# Patient Record
Sex: MALE | Race: WHITE
[De-identification: names, ages, dates, MRNs, and addresses within clinical notes are randomized per-mention and may not be internally consistent; named-entity substitution may affect disease eponyms.]

---

## 2018-07-31 PROBLEM — Z00.00 ENCOUNTER FOR PREVENTIVE HEALTH EXAMINATION: Status: ACTIVE | Noted: 2018-07-31

## 2018-08-14 ENCOUNTER — RECORD ABSTRACTING (OUTPATIENT)
Age: 63
End: 2018-08-14

## 2018-08-14 DIAGNOSIS — Z82.49 FAMILY HISTORY OF ISCHEMIC HEART DISEASE AND OTHER DISEASES OF THE CIRCULATORY SYSTEM: ICD-10-CM

## 2018-08-14 DIAGNOSIS — Z80.3 FAMILY HISTORY OF MALIGNANT NEOPLASM OF BREAST: ICD-10-CM

## 2018-08-14 DIAGNOSIS — F17.200 NICOTINE DEPENDENCE, UNSPECIFIED, UNCOMPLICATED: ICD-10-CM

## 2018-08-14 LAB — HBA1C MFR BLD: 5

## 2018-08-30 ENCOUNTER — APPOINTMENT (OUTPATIENT)
Dept: ENDOCRINOLOGY | Facility: CLINIC | Age: 63
End: 2018-08-30
Payer: COMMERCIAL

## 2018-08-30 VITALS
DIASTOLIC BLOOD PRESSURE: 88 MMHG | BODY MASS INDEX: 29.9 KG/M2 | HEART RATE: 98 BPM | WEIGHT: 233 LBS | SYSTOLIC BLOOD PRESSURE: 140 MMHG | HEIGHT: 74 IN

## 2018-08-30 DIAGNOSIS — E78.4 OTHER HYPERLIPIDEMIA: ICD-10-CM

## 2018-08-30 DIAGNOSIS — R73.09 OTHER ABNORMAL GLUCOSE: ICD-10-CM

## 2018-08-30 DIAGNOSIS — E21.2 OTHER HYPERPARATHYROIDISM: ICD-10-CM

## 2018-08-30 PROCEDURE — 99214 OFFICE O/P EST MOD 30 MIN: CPT | Mod: 25

## 2018-08-30 PROCEDURE — 76536 US EXAM OF HEAD AND NECK: CPT

## 2018-08-30 RX ORDER — CHOLECALCIFEROL (VITAMIN D3) 50 MCG
50 MCG TABLET ORAL DAILY
Refills: 0 | Status: ACTIVE | COMMUNITY

## 2018-11-28 ENCOUNTER — APPOINTMENT (OUTPATIENT)
Dept: ENDOCRINOLOGY | Facility: CLINIC | Age: 63
End: 2018-11-28

## 2018-11-28 ENCOUNTER — APPOINTMENT (OUTPATIENT)
Dept: ENDOCRINOLOGY | Facility: CLINIC | Age: 63
End: 2018-11-28
Payer: COMMERCIAL

## 2018-11-28 VITALS
HEART RATE: 85 BPM | WEIGHT: 233 LBS | DIASTOLIC BLOOD PRESSURE: 80 MMHG | BODY MASS INDEX: 29.9 KG/M2 | SYSTOLIC BLOOD PRESSURE: 130 MMHG | HEIGHT: 74 IN

## 2018-11-28 DIAGNOSIS — F17.210 NICOTINE DEPENDENCE, CIGARETTES, UNCOMPLICATED: ICD-10-CM

## 2018-11-28 PROCEDURE — 99406 BEHAV CHNG SMOKING 3-10 MIN: CPT

## 2018-11-28 PROCEDURE — 99214 OFFICE O/P EST MOD 30 MIN: CPT | Mod: 25

## 2018-12-26 RX ORDER — TESTOSTERONE 12.5 MG/1
12.5 MG/ACT GEL, METERED TOPICAL
Qty: 6 | Refills: 0 | Status: DISCONTINUED | COMMUNITY
End: 2018-12-26

## 2019-03-18 ENCOUNTER — APPOINTMENT (OUTPATIENT)
Dept: ENDOCRINOLOGY | Facility: CLINIC | Age: 64
End: 2019-03-18
Payer: COMMERCIAL

## 2019-03-18 VITALS
WEIGHT: 235 LBS | HEART RATE: 95 BPM | DIASTOLIC BLOOD PRESSURE: 70 MMHG | SYSTOLIC BLOOD PRESSURE: 130 MMHG | BODY MASS INDEX: 30.16 KG/M2 | HEIGHT: 74 IN

## 2019-03-18 PROCEDURE — 99213 OFFICE O/P EST LOW 20 MIN: CPT

## 2019-03-19 NOTE — PHYSICAL EXAM
[Alert] : alert [No Acute Distress] : no acute distress [Well Nourished] : well nourished [Well Developed] : well developed [EOMI] : extra ocular movement intact [Normal Sclera/Conjunctiva] : normal sclera/conjunctiva [Normal Hearing] : hearing was normal [Supple] : the neck was supple [No LAD] : no lymphadenopathy [Thyroid Not Enlarged] : the thyroid was not enlarged [No Thyroid Nodules] : there were no palpable thyroid nodules [Normal Rate and Effort] : normal respiratory rhythm and effort [Clear to Auscultation] : lungs were clear to auscultation bilaterally [No Accessory Muscle Use] : no accessory muscle use [Normal Rate] : heart rate was normal  [Normal S1, S2] : normal S1 and S2 [Regular Rhythm] : with a regular rhythm [No Motor Deficits] : the motor exam was normal [Normal Gait] : normal gait [Acanthosis Nigricans] : no acanthosis nigricans [No Tremors] : no tremors [Normal Insight/Judgement] : insight and judgment were intact [Oriented x3] : oriented to person, place, and time [Normal Mood] : the mood was normal

## 2019-03-19 NOTE — REVIEW OF SYSTEMS
[Recent Weight Loss (___ Lbs)] : recent [unfilled] ~Ulb weight loss [Fatigue] : no fatigue [Visual Field Defect] : no visual field defect [Decreased Appetite] : appetite not decreased [Blurry Vision] : no blurred vision [Dysphagia] : no dysphagia [Dysphonia] : no dysphonia [Neck Pain] : no neck pain [Chest Pain] : no chest pain [Headache] : no headaches [Tremors] : no tremors [Palpitations] : no palpitations [Depression] : no depression [Anxiety] : no anxiety [Cold Intolerance] : cold tolerant [Heat Intolerance] : heat tolerant [Easy Bruising] : no tendency for easy bruising [Swelling] : no swelling

## 2019-03-19 NOTE — ASSESSMENT
[FreeTextEntry1] : 64 y/o male with Testicular hypofunction, Hyperlipidemia, Hyperparathyroidism and Vitamin D Deficiency. No recent labs. \par \par Plan: \par Testicular Hypofunction: labs now\par - DEXA in 2018 revealed Osteopenia - next DEXA in 2020\par - Pt. refuses MRI due to feelings of claustrophobic with open MRI\par - continue current dose of Testosterone (1%) Transdermal Gel 3 pumps a daily\par \par Hyperlipidemia: labs now\par \par Hyperparathyroidism: check labs now, ordered 24 hour urine calcium \par \par Vitamin D Deficiency: labs now, continue current vitamin D deficiency supplement \par \par Follow up in 3 months.

## 2019-03-19 NOTE — REASON FOR VISIT
[Follow-Up: _____] : a [unfilled] follow-up visit [FreeTextEntry1] : Testicular hypofunction, Hyperlipidemia, Hyperparathyroidism and Vitamin D Deficiency

## 2019-03-19 NOTE — HISTORY OF PRESENT ILLNESS
[FreeTextEntry1] : Quality: Testicular Hypofunction \par Severity: mild\par Duration: over 4 years\par Onset: fatigue \par Modifying Factors: Better with medication \par Associated Symptoms:\par \par Notes:\par \par \par Current Regimen: Testosterone (1%) Transdermal Gel 3 pumps a daily\par \par \par Labs reviewed: No recent labs\par \par Date of last thyroid sonogram: 8/30/18: Impression: normal thyroid. no nodules

## 2019-05-07 ENCOUNTER — RX RENEWAL (OUTPATIENT)
Age: 64
End: 2019-05-07

## 2019-05-08 ENCOUNTER — RX RENEWAL (OUTPATIENT)
Age: 64
End: 2019-05-08

## 2019-07-19 ENCOUNTER — APPOINTMENT (OUTPATIENT)
Dept: ENDOCRINOLOGY | Facility: CLINIC | Age: 64
End: 2019-07-19

## 2019-08-19 ENCOUNTER — MEDICATION RENEWAL (OUTPATIENT)
Age: 64
End: 2019-08-19

## 2019-08-19 ENCOUNTER — RX RENEWAL (OUTPATIENT)
Age: 64
End: 2019-08-19

## 2019-09-16 ENCOUNTER — APPOINTMENT (OUTPATIENT)
Dept: ENDOCRINOLOGY | Facility: CLINIC | Age: 64
End: 2019-09-16
Payer: COMMERCIAL

## 2019-09-16 VITALS
HEART RATE: 99 BPM | WEIGHT: 230 LBS | DIASTOLIC BLOOD PRESSURE: 80 MMHG | BODY MASS INDEX: 29.52 KG/M2 | HEIGHT: 74 IN | SYSTOLIC BLOOD PRESSURE: 140 MMHG

## 2019-09-16 PROCEDURE — 99214 OFFICE O/P EST MOD 30 MIN: CPT

## 2019-09-16 NOTE — PHYSICAL EXAM
[Alert] : alert [No Acute Distress] : no acute distress [Well Nourished] : well nourished [Normal Sclera/Conjunctiva] : normal sclera/conjunctiva [Well Developed] : well developed [Normal Hearing] : hearing was normal [EOMI] : extra ocular movement intact [No LAD] : no lymphadenopathy [Supple] : the neck was supple [Thyroid Not Enlarged] : the thyroid was not enlarged [No Thyroid Nodules] : there were no palpable thyroid nodules [No Accessory Muscle Use] : no accessory muscle use [Normal Rate and Effort] : normal respiratory rhythm and effort [Clear to Auscultation] : lungs were clear to auscultation bilaterally [Normal Rate] : heart rate was normal  [Regular Rhythm] : with a regular rhythm [Normal S1, S2] : normal S1 and S2 [No Motor Deficits] : the motor exam was normal [No Tremors] : no tremors [Oriented x3] : oriented to person, place, and time [Normal Mood] : the mood was normal [Normal Insight/Judgement] : insight and judgment were intact

## 2019-09-16 NOTE — HISTORY OF PRESENT ILLNESS
[FreeTextEntry1] : Quality: Testicular Hypofunction \par Severity: mild\par Duration: over 4 years\par Onset: fatigue \par Modifying Factors: Better with medication \par Associated Symptoms:\par \par Current Regimen: Testosterone (1%) Transdermal Gel 3 pumps a daily\par \par

## 2019-09-16 NOTE — ASSESSMENT
[FreeTextEntry1] : Testicular Hypofunction: labs now\par - DEXA in 2018 revealed Osteopenia - next DEXA in 2020\par - Pt. refuses MRI due to feelings of claustrophobic with open MRI\par - continue current dose of Testosterone (1%) Transdermal Gel 3 pumps a daily\par \par Hyperlipidemia: labs now\par \par Hyperparathyroidism: check labs now, ordered 24 hour urine calcium \par \par Vitamin D Deficiency: labs now\par \par Follow up in 3 months.

## 2019-09-16 NOTE — REVIEW OF SYSTEMS
[Fatigue] : no fatigue [Decreased Appetite] : appetite not decreased [Recent Weight Gain (___ Lbs)] : no recent weight gain [Recent Weight Loss (___ Lbs)] : no recent weight loss [Blurry Vision] : no blurred vision [Visual Field Defect] : no visual field defect [Dysphagia] : no dysphagia [Neck Pain] : no neck pain [Dysphonia] : no dysphonia [Palpitations] : no palpitations [Chest Pain] : no chest pain [Headache] : no headaches [Tremors] : no tremors [Depression] : no depression [Cold Intolerance] : cold tolerant [Anxiety] : no anxiety [Heat Intolerance] : heat tolerant [Swelling] : no swelling [Easy Bruising] : no tendency for easy bruising

## 2019-11-14 ENCOUNTER — RX CHANGE (OUTPATIENT)
Age: 64
End: 2019-11-14

## 2019-11-18 ENCOUNTER — RX RENEWAL (OUTPATIENT)
Age: 64
End: 2019-11-18

## 2020-01-07 ENCOUNTER — APPOINTMENT (OUTPATIENT)
Dept: ENDOCRINOLOGY | Facility: CLINIC | Age: 65
End: 2020-01-07
Payer: COMMERCIAL

## 2020-01-07 VITALS
BODY MASS INDEX: 30.29 KG/M2 | HEART RATE: 81 BPM | DIASTOLIC BLOOD PRESSURE: 78 MMHG | SYSTOLIC BLOOD PRESSURE: 138 MMHG | WEIGHT: 236 LBS | HEIGHT: 74 IN

## 2020-01-07 PROCEDURE — 99214 OFFICE O/P EST MOD 30 MIN: CPT

## 2020-01-07 NOTE — HISTORY OF PRESENT ILLNESS
[FreeTextEntry1] : Quality: Hypogonadotropic hypogonadism\par Severity: mild\par Duration: over 4 years\par Onset: fatigue \par Modifying Factors: Better with medication \par Associated Symptoms: worsening CBC with 4 pumps daily\par \par Current Regimen: Testosterone (1%) Transdermal Gel 3 pumps a daily\par \par

## 2020-01-07 NOTE — PHYSICAL EXAM
[Alert] : alert [No Acute Distress] : no acute distress [Well Nourished] : well nourished [Well Developed] : well developed [Normal Sclera/Conjunctiva] : normal sclera/conjunctiva [EOMI] : extra ocular movement intact [Normal Hearing] : hearing was normal [No LAD] : no lymphadenopathy [Thyroid Not Enlarged] : the thyroid was not enlarged [Supple] : the neck was supple [No Thyroid Nodules] : there were no palpable thyroid nodules [Normal Rate and Effort] : normal respiratory rhythm and effort [No Accessory Muscle Use] : no accessory muscle use [Clear to Auscultation] : lungs were clear to auscultation bilaterally [Normal Rate] : heart rate was normal  [Regular Rhythm] : with a regular rhythm [Normal S1, S2] : normal S1 and S2 [No Motor Deficits] : the motor exam was normal [Oriented x3] : oriented to person, place, and time [No Tremors] : no tremors [Normal Mood] : the mood was normal [Normal Insight/Judgement] : insight and judgment were intact

## 2020-01-07 NOTE — ASSESSMENT
[FreeTextEntry1] : Hypogonadotropic hypogonadism with osteopenia -testo levels low normal\par - DEXA in 3/2018 revealed Osteopenia - next DEXA in 3/2020\par - Pt. refuses MRI due to feelings of claustrophobic with open MRI\par - continue current dose of Testosterone (1%) Transdermal Gel 3 pumps a daily\par - repeat Testo levels, check CBC, check PSA\par \par Hyperlipidemia: levels ok, no statin at this time\par \par Hyperparathyroidism, normal calcium, normal vit D : 24 hour urine calcium, cont to monitor, DXA 3/2020\par \par

## 2020-01-07 NOTE — DATA REVIEWED
[FreeTextEntry1] : Labs 12/7/19\par Gluc 112, A1c 5.1\par Cr 0.90\par LFTs normal\par Ca 10, Phos 3.7, Mg 2.3, iPTH 74, Vit D 35, ionized calcium 5.1\par urine microalb/Cr 5\par LDL 99, HDL 77, Tg 94\par TSH 2.66\par Testo 271, free Testo 37.1

## 2020-01-07 NOTE — REVIEW OF SYSTEMS
[Fatigue] : no fatigue [Decreased Appetite] : appetite not decreased [Recent Weight Gain (___ Lbs)] : no recent weight gain [Recent Weight Loss (___ Lbs)] : no recent weight loss [Visual Field Defect] : no visual field defect [Blurry Vision] : no blurred vision [Dysphagia] : no dysphagia [Dysphonia] : no dysphonia [Neck Pain] : no neck pain [Chest Pain] : no chest pain [Palpitations] : no palpitations [Headache] : no headaches [Tremors] : no tremors [Anxiety] : no anxiety [Depression] : no depression [Cold Intolerance] : cold tolerant [Heat Intolerance] : heat tolerant [Easy Bruising] : no tendency for easy bruising [Swelling] : no swelling

## 2020-06-26 ENCOUNTER — LABORATORY RESULT (OUTPATIENT)
Age: 65
End: 2020-06-26

## 2020-06-26 ENCOUNTER — APPOINTMENT (OUTPATIENT)
Dept: ENDOCRINOLOGY | Facility: CLINIC | Age: 65
End: 2020-06-26
Payer: COMMERCIAL

## 2020-06-26 PROCEDURE — 36415 COLL VENOUS BLD VENIPUNCTURE: CPT

## 2020-06-28 ENCOUNTER — TRANSCRIPTION ENCOUNTER (OUTPATIENT)
Age: 65
End: 2020-06-28

## 2020-06-30 ENCOUNTER — APPOINTMENT (OUTPATIENT)
Dept: ENDOCRINOLOGY | Facility: CLINIC | Age: 65
End: 2020-06-30

## 2020-06-30 LAB
25(OH)D3 SERPL-MCNC: 36.2 NG/ML
ALBUMIN SERPL ELPH-MCNC: 4.7 G/DL
ALP BLD-CCNC: 67 U/L
ALT SERPL-CCNC: 31 U/L
ANION GAP SERPL CALC-SCNC: 14 MMOL/L
AST SERPL-CCNC: 36 U/L
BASOPHILS # BLD AUTO: 0.04 K/UL
BASOPHILS NFR BLD AUTO: 0.9 %
BILIRUB SERPL-MCNC: 0.4 MG/DL
BUN SERPL-MCNC: 5 MG/DL
CALCIUM SERPL-MCNC: 9.8 MG/DL
CHLORIDE SERPL-SCNC: 97 MMOL/L
CHOLEST SERPL-MCNC: 175 MG/DL
CHOLEST/HDLC SERPL: 2.3 RATIO
CO2 SERPL-SCNC: 25 MMOL/L
CREAT SERPL-MCNC: 0.84 MG/DL
EOSINOPHIL # BLD AUTO: 0.12 K/UL
EOSINOPHIL NFR BLD AUTO: 2.5 %
GLUCOSE SERPL-MCNC: 103 MG/DL
HCT VFR BLD CALC: 53.4 %
HDLC SERPL-MCNC: 75 MG/DL
HGB BLD-MCNC: 17 G/DL
LDLC SERPL CALC-MCNC: 77 MG/DL
LYMPHOCYTES # BLD AUTO: 1.16 K/UL
LYMPHOCYTES NFR BLD AUTO: 23.7 %
MAN DIFF?: NORMAL
MCHC RBC-ENTMCNC: 31.8 GM/DL
MCHC RBC-ENTMCNC: 35.1 PG
MCV RBC AUTO: 110.1 FL
MONOCYTES # BLD AUTO: 0.67 K/UL
MONOCYTES NFR BLD AUTO: 13.6 %
NEUTROPHILS # BLD AUTO: 2.91 K/UL
NEUTROPHILS NFR BLD AUTO: 59.3 %
PLATELET # BLD AUTO: 264 K/UL
POTASSIUM SERPL-SCNC: 5.2 MMOL/L
PROT SERPL-MCNC: 7.2 G/DL
PSA SERPL-MCNC: 0.83 NG/ML
RBC # BLD: 4.85 M/UL
RBC # FLD: 12.5 %
SHBG SERPL-SCNC: 44 NMOL/L
SODIUM SERPL-SCNC: 136 MMOL/L
TRIGL SERPL-MCNC: 116 MG/DL
WBC # FLD AUTO: 4.9 K/UL

## 2020-07-02 LAB
TESTOST BND SERPL-MCNC: 7.3 PG/ML
TESTOST SERPL-MCNC: 431.9 NG/DL

## 2020-07-21 ENCOUNTER — APPOINTMENT (OUTPATIENT)
Dept: ENDOCRINOLOGY | Facility: CLINIC | Age: 65
End: 2020-07-21
Payer: COMMERCIAL

## 2020-07-21 VITALS
WEIGHT: 230 LBS | BODY MASS INDEX: 29.52 KG/M2 | SYSTOLIC BLOOD PRESSURE: 128 MMHG | HEIGHT: 74 IN | OXYGEN SATURATION: 98 % | HEART RATE: 96 BPM | DIASTOLIC BLOOD PRESSURE: 82 MMHG

## 2020-07-21 DIAGNOSIS — Z11.59 ENCOUNTER FOR SCREENING FOR OTHER VIRAL DISEASES: ICD-10-CM

## 2020-07-21 PROCEDURE — 99215 OFFICE O/P EST HI 40 MIN: CPT

## 2020-07-21 RX ORDER — TESTOSTERONE 12.5 MG/1
12.5 MG/ACT GEL, METERED TOPICAL
Qty: 6 | Refills: 0 | Status: DISCONTINUED | COMMUNITY
Start: 2018-12-26 | End: 2020-07-21

## 2020-07-21 NOTE — REVIEW OF SYSTEMS
[Recent Weight Gain (___ Lbs)] : no recent weight gain [Recent Weight Loss (___ Lbs)] : no recent weight loss [Blurred Vision] : no blurred vision [Palpitations] : no palpitations [Chest Pain] : no chest pain [SOB on Exertion] : no shortness of breath on exertion [Shortness Of Breath] : no shortness of breath [Constipation] : no constipation [Abdominal Pain] : no abdominal pain [Nausea] : no nausea [Diarrhea] : no diarrhea [Nocturia] : no nocturia [Polyuria] : no polyuria [Joint Pain] : no joint pain [Myalgia] : no myalgia  [Depression] : no depression [Dizziness] : no dizziness [Anxiety] : no anxiety

## 2020-07-21 NOTE — ASSESSMENT
[FreeTextEntry1] : Hypogonadotropic hypogonadism with osteopenia -testo levels okay but now HCT 53%, osteopenia improved on DXA 2018\par - repeat DXA overdue, rc give\par - Pt. refuses MRI due to feelings of claustrophobic with open MRI\par - stop Testosterone (1%) Transdermal Gel 3 pumps a daily and repeat CBC 1 month, risks of elevated Hct count dicussed, may need eval for sleep apnea\par - repeat Testo levels, check CBC in 1 month\par \par Hyperlipidemia: levels ok, no statin at this time\par \par Hyperparathyroidism, normal calcium, normal vit D : 24 hour urine calcium, cont to monitor, DXA 3/2020\par \par IFG, fluctuating A1c levels - watch diet, increase exercise, repeat levels 1 month\par \par Vit D def - levels ok, cont OTC vit D\par \par pt requests COVIDab testing\par \par

## 2020-07-21 NOTE — PHYSICAL EXAM
[Alert] : alert [Healthy Appearance] : healthy appearance [Well Nourished] : well nourished [No Acute Distress] : no acute distress [EOMI] : extra ocular movement intact [No LAD] : no lymphadenopathy [No Thyroid Nodules] : no palpable thyroid nodules [Thyroid Not Enlarged] : the thyroid was not enlarged [No Accessory Muscle Use] : no accessory muscle use [Clear to Auscultation] : lungs were clear to auscultation bilaterally [Normal Rate] : heart rate was normal [Normal S1, S2] : normal S1 and S2 [Normal Bowel Sounds] : normal bowel sounds [No Edema] : no peripheral edema [Not Tender] : non-tender [Soft] : abdomen soft [No Rash] : no rash [Normal Gait] : normal gait [Normal Reflexes] : deep tendon reflexes were 2+ and symmetric [Cranial Nerves Intact] : cranial nerves 2-12 were intact [Normal Affect] : the affect was normal [Oriented x3] : oriented to person, place, and time [Normal Insight/Judgement] : insight and judgment were intact [Normal Mood] : the mood was normal

## 2020-07-21 NOTE — RESULTS/DATA
[FreeTextEntry1] : Bone Density 2014 Tscores\par L1-4 -1.7\par Left FN -1.5\par Right FN -1.6\par FRAX MOF 7%, hip Fx 1.6% [de-identified] : Bone Density 2018 Tscores\par L1-4 -1.0\par Left FN -1.2\par Right FN -1.0\par FRAX MOF 12% ip Fx 1.4%

## 2020-07-21 NOTE — HISTORY OF PRESENT ILLNESS
[FreeTextEntry1] : Interval Hx: no issues, no changes\par \par 1. Quality: Hypogonadotropic hypogonadism\par Severity: mild\par Duration: 2015\par Onset: fatigue, 2014 testo 161, 167 and 173 -started testo supplementation 4/2014\par Modifying Factors: Better with medication. Normal prolactin and thyroid levels 2014, has never had MRI done due to cpt refusal (claustrophobia)\par Associated Symptoms: worsening CBC with 4 pumps daily in 2017\par Current Regimen: Testosterone (1%) Transdermal Gel 3 pumps a daily\par \par 2. Osteopenia found on screening DXA 2014 and 3. vit D def (2013 Vitd 25). does not take OTC calcium. on daily OTC vit D3\par 4. PreDM w IFG since 2014 w A1c 5.7% and has been fluctuating btw 5.0-5.7\par 5. mild HPT with normal calcium since 2018\par \par

## 2020-07-21 NOTE — REASON FOR VISIT
[Follow - Up] : a follow-up visit [Hyperparathyroidism] : hyperparathyroidism [Other___] : [unfilled] [Hypogonadism] : hypogonadism

## 2020-08-25 ENCOUNTER — APPOINTMENT (OUTPATIENT)
Dept: ENDOCRINOLOGY | Facility: CLINIC | Age: 65
End: 2020-08-25

## 2020-09-22 ENCOUNTER — APPOINTMENT (OUTPATIENT)
Dept: ENDOCRINOLOGY | Facility: CLINIC | Age: 65
End: 2020-09-22
Payer: COMMERCIAL

## 2020-09-22 ENCOUNTER — LABORATORY RESULT (OUTPATIENT)
Age: 65
End: 2020-09-22

## 2020-09-22 PROCEDURE — 36415 COLL VENOUS BLD VENIPUNCTURE: CPT

## 2020-10-06 LAB
24R-OH-CALCIDIOL SERPL-MCNC: 54.5 PG/ML
25(OH)D3 SERPL-MCNC: 27.7 NG/ML
ALBUMIN SERPL ELPH-MCNC: 4.7 G/DL
ANION GAP SERPL CALC-SCNC: 21 MMOL/L
BASOPHILS # BLD AUTO: 0.06 K/UL
BASOPHILS NFR BLD AUTO: 1.3 %
BUN SERPL-MCNC: 8 MG/DL
CA-I SERPL-SCNC: 0.98 MMOL/L
CALCIUM SERPL-MCNC: 10.1 MG/DL
CALCIUM SERPL-MCNC: 10.1 MG/DL
CHLORIDE SERPL-SCNC: 96 MMOL/L
CO2 SERPL-SCNC: 20 MMOL/L
CREAT SERPL-MCNC: 0.88 MG/DL
EOSINOPHIL # BLD AUTO: 0.1 K/UL
EOSINOPHIL NFR BLD AUTO: 2.2 %
GLUCOSE SERPL-MCNC: 103 MG/DL
HCT VFR BLD CALC: 53.6 %
HGB BLD-MCNC: 17 G/DL
IMM GRANULOCYTES NFR BLD AUTO: 0.2 %
LYMPHOCYTES # BLD AUTO: 0.79 K/UL
LYMPHOCYTES NFR BLD AUTO: 17.4 %
MAGNESIUM SERPL-MCNC: 2.3 MG/DL
MAN DIFF?: NORMAL
MCHC RBC-ENTMCNC: 31.7 GM/DL
MCHC RBC-ENTMCNC: 35.1 PG
MCV RBC AUTO: 110.5 FL
MONOCYTES # BLD AUTO: 0.5 K/UL
MONOCYTES NFR BLD AUTO: 11 %
NEUTROPHILS # BLD AUTO: 3.09 K/UL
NEUTROPHILS NFR BLD AUTO: 67.9 %
PARATHYROID HORMONE INTACT: 40 PG/ML
PHOSPHATE SERPL-MCNC: 3.8 MG/DL
PLATELET # BLD AUTO: 328 K/UL
POTASSIUM SERPL-SCNC: 5.8 MMOL/L
RBC # BLD: 4.85 M/UL
RBC # FLD: 12.6 %
SODIUM SERPL-SCNC: 137 MMOL/L
TESTOST BND SERPL-MCNC: 3.1 PG/ML
TESTOST SERPL-MCNC: 190.7 NG/DL
WBC # FLD AUTO: 4.55 K/UL

## 2020-10-07 DIAGNOSIS — E87.5 HYPERKALEMIA: ICD-10-CM

## 2020-10-21 ENCOUNTER — TRANSCRIPTION ENCOUNTER (OUTPATIENT)
Age: 65
End: 2020-10-21

## 2020-10-31 ENCOUNTER — APPOINTMENT (OUTPATIENT)
Dept: ENDOCRINOLOGY | Facility: CLINIC | Age: 65
End: 2020-10-31
Payer: COMMERCIAL

## 2020-10-31 DIAGNOSIS — E21.3 HYPERPARATHYROIDISM, UNSPECIFIED: ICD-10-CM

## 2020-10-31 DIAGNOSIS — E78.5 HYPERLIPIDEMIA, UNSPECIFIED: ICD-10-CM

## 2020-10-31 DIAGNOSIS — E55.9 VITAMIN D DEFICIENCY, UNSPECIFIED: ICD-10-CM

## 2020-10-31 DIAGNOSIS — M85.80 OTHER SPECIFIED DISORDERS OF BONE DENSITY AND STRUCTURE, UNSPECIFIED SITE: ICD-10-CM

## 2020-10-31 DIAGNOSIS — E29.1 TESTICULAR HYPOFUNCTION: ICD-10-CM

## 2020-10-31 DIAGNOSIS — R73.01 IMPAIRED FASTING GLUCOSE: ICD-10-CM

## 2020-10-31 DIAGNOSIS — R71.8 OTHER ABNORMALITY OF RED BLOOD CELLS: ICD-10-CM

## 2020-10-31 PROCEDURE — 99214 OFFICE O/P EST MOD 30 MIN: CPT | Mod: 95

## 2020-10-31 NOTE — PHYSICAL EXAM
Viral Upper Respiratory Illness (Child)  Your child has a viral upper respiratory illness (URI), which is another term for the common cold. The virus is contagious during the first few days.  It is spread through the air by coughing, sneezing, or by direc · Cough. Coughing is a normal part of this illness. A cool mist humidifier at the bedside may be helpful. Be sure to clean the humidifier every day to prevent mold.  Over-the-counter cough and cold medicines have not proved to be any more helpful than a billie ¨ Your child is 1 months old or younger and has a fever of 100.4°F (38°C) or higher. Get medical care right away. Fever in a young baby can be a sign of a dangerous infection. ¨ Your child is of any age and has repeated fevers above 104°F (40°C).   ¨ Your There is no cure for the common cold. An older child usually does not need to see a doctor unless the cold becomes serious. If your child is 3 months or younger, call your health care provider at the first sign of illness.  A young baby's cold can become mo [Alert] : alert [Well Nourished] : well nourished · Use cough medicine for children age 10 or older only if advised by your child’s doctor. Preventing colds  To help children stay healthy:  · Teach children to wash their hands often.  This includes before eating and after using the bathroom, playing with a [Healthy Appearance] : healthy appearance · Your child's symptoms seem to be getting worse  · Your child doesn’t look or act right to you   Date Last Reviewed: 11/1/2016  © 0495-0935 [employer]. [id number] [place], [address] [place] [address]. All rights reserved.  This information is not [No Acute Distress] : no acute distress [EOMI] : extra ocular movement intact [No Respiratory Distress] : no respiratory distress [No Edema] : no peripheral edema [Oriented x3] : oriented to person, place, and time [Normal Affect] : the affect was normal [Normal Insight/Judgement] : insight and judgment were intact [Normal Mood] : the mood was normal

## 2020-10-31 NOTE — ASSESSMENT
[FreeTextEntry1] : Hypogonadotropic hypogonadism with osteopenia -testo levels low off testo and no improvement in HCT 53%, osteopenia improved on DXA 2018\par - repeat DXA overdue, pt has Rx\par - Pt. refuses MRI due to feelings of claustrophobic with open MRI\par - advised Heme eval and Pulm eval fo sleep stucy\par - no testo replacement therapy at this time, will need to reassess therapy at a later time after work up done for Hct - due to age may no longer be necessary\par \par Hyperlipidemia: levels ok, no statin at this time, cont to monitor\par \par Hyperparathyroidism, normal calcium, normal vit D : 24 hour urine calcium, cont to monitor, DXA due\par \par IFG, fluctuating A1c levels - watch diet, increase exercise, cont to monitor\par \par Vit D def - cont OTC vit D\par \par \par \par

## 2020-10-31 NOTE — REVIEW OF SYSTEMS
[Recent Weight Gain (___ Lbs)] : no recent weight gain [Recent Weight Loss (___ Lbs)] : no recent weight loss [Blurred Vision] : no blurred vision [Chest Pain] : no chest pain [Palpitations] : no palpitations [Shortness Of Breath] : no shortness of breath [SOB on Exertion] : no shortness of breath on exertion [Nausea] : no nausea [Constipation] : no constipation [Abdominal Pain] : no abdominal pain [Diarrhea] : no diarrhea [Polyuria] : no polyuria [Nocturia] : no nocturia [Joint Pain] : no joint pain [Myalgia] : no myalgia  [Dizziness] : no dizziness [Depression] : no depression [Anxiety] : no anxiety

## 2020-10-31 NOTE — RESULTS/DATA
[FreeTextEntry1] : Bone Density 2014 Tscores\par L1-4 -1.7\par Left FN -1.5\par Right FN -1.6\par FRAX MOF 7%, hip Fx 1.6% [de-identified] : Bone Density 2018 Tscores\par L1-4 -1.0\par Left FN -1.2\par Right FN -1.0\par FRAX MOF 12% ip Fx 1.4%

## 2020-10-31 NOTE — REASON FOR VISIT
[Home] : at home, [unfilled] , at the time of the visit. [Medical Office: ([facility])___] : at the medical office located in  [Verbal consent obtained from patient] : the patient, [unfilled] [Follow - Up] : a follow-up visit [Hyperparathyroidism] : hyperparathyroidism [Hypogonadism] : hypogonadism [Other___] : [unfilled]

## 2020-10-31 NOTE — HISTORY OF PRESENT ILLNESS
[FreeTextEntry1] : Interval Hx: no issues\par \par 1. Quality: Hypogonadotropic hypogonadism\par Severity: mild\par Duration: 2015\par Onset: fatigue, 2014 testo 161, 167 and 173 -started testo supplementation 4/2014\par Modifying Factors: Better with medication. Normal prolactin and thyroid levels 2014, has never had MRI done due to cpt refusal (claustrophobia)\par Associated Symptoms: worsening CBC with 4 pumps daily in 2017 then improved and resumed therapy at 3 pumps daily\par Current Regimen: Testosterone (1%) Transdermal Gel 3 pumps a daily - has been off this since 7/2020 due to elevated Hct levels\par \par 2. Osteopenia found on screening DXA 2014 and DXA 2018 and 3. vit D def (2013 Vitd 25). does not take OTC calcium. on daily OTC vit D3. no recent DXA. no falls/fractures. no hip/marcelino pains. \par \par 4. PreDM w IFG since 2014 w A1c 5.7% and has been fluctuating btw 5.0-5.7\par \par 5. mild HPT with normal calcium since 2018\par \par

## 2020-12-30 NOTE — DATA REVIEWED
Called   Pt's  Daughter  No  Answer  LM    Do  Not  Take   Vitamins  When taking  Her   meds   Together    [FreeTextEntry1] : Labs 12/7/19\par Gluc 112, A1c 5.1\par Cr 0.90\par LFTs normal\par Ca 10, Phos 3.7, Mg 2.3, iPTH 74, Vit D 35, ionized calcium 5.1\par urine microalb/Cr 5\par LDL 99, HDL 77, Tg 94\par TSH 2.66\par Testo 271, free Testo 37.1

## 2022-04-11 PROBLEM — Z11.59 SCREENING FOR VIRAL DISEASE: Status: ACTIVE | Noted: 2020-07-21
